# Patient Record
Sex: MALE | Race: WHITE | NOT HISPANIC OR LATINO
[De-identification: names, ages, dates, MRNs, and addresses within clinical notes are randomized per-mention and may not be internally consistent; named-entity substitution may affect disease eponyms.]

---

## 2020-02-10 PROBLEM — Z00.00 ENCOUNTER FOR PREVENTIVE HEALTH EXAMINATION: Status: ACTIVE | Noted: 2020-02-10

## 2020-02-11 ENCOUNTER — APPOINTMENT (OUTPATIENT)
Dept: MRI IMAGING | Facility: CLINIC | Age: 50
End: 2020-02-11

## 2020-06-09 ENCOUNTER — APPOINTMENT (OUTPATIENT)
Dept: MRI IMAGING | Facility: HOSPITAL | Age: 50
End: 2020-06-09
Payer: COMMERCIAL

## 2020-06-09 ENCOUNTER — OUTPATIENT (OUTPATIENT)
Dept: OUTPATIENT SERVICES | Facility: HOSPITAL | Age: 50
LOS: 1 days | End: 2020-06-09
Payer: COMMERCIAL

## 2020-06-09 DIAGNOSIS — Z00.8 ENCOUNTER FOR OTHER GENERAL EXAMINATION: ICD-10-CM

## 2020-06-09 PROCEDURE — A9579: CPT

## 2020-06-09 PROCEDURE — 74183 MRI ABD W/O CNTR FLWD CNTR: CPT | Mod: 26

## 2020-06-09 PROCEDURE — 74183 MRI ABD W/O CNTR FLWD CNTR: CPT

## 2021-02-15 ENCOUNTER — TRANSCRIPTION ENCOUNTER (OUTPATIENT)
Age: 51
End: 2021-02-15

## 2021-06-10 ENCOUNTER — NON-APPOINTMENT (OUTPATIENT)
Age: 51
End: 2021-06-10

## 2021-06-10 ENCOUNTER — APPOINTMENT (OUTPATIENT)
Dept: ELECTROPHYSIOLOGY | Facility: CLINIC | Age: 51
End: 2021-06-10
Payer: COMMERCIAL

## 2021-06-10 VITALS
OXYGEN SATURATION: 98 % | BODY MASS INDEX: 30.09 KG/M2 | DIASTOLIC BLOOD PRESSURE: 65 MMHG | SYSTOLIC BLOOD PRESSURE: 120 MMHG | HEIGHT: 75 IN | HEART RATE: 82 BPM | WEIGHT: 242 LBS

## 2021-06-10 DIAGNOSIS — F17.210 NICOTINE DEPENDENCE, CIGARETTES, UNCOMPLICATED: ICD-10-CM

## 2021-06-10 DIAGNOSIS — Z78.9 OTHER SPECIFIED HEALTH STATUS: ICD-10-CM

## 2021-06-10 PROCEDURE — 93000 ELECTROCARDIOGRAM COMPLETE: CPT

## 2021-06-10 PROCEDURE — 99072 ADDL SUPL MATRL&STAF TM PHE: CPT

## 2021-06-10 PROCEDURE — 99205 OFFICE O/P NEW HI 60 MIN: CPT

## 2021-07-12 PROBLEM — F17.210 SMOKES CIGARETTES: Status: ACTIVE | Noted: 2021-06-10

## 2021-07-12 PROBLEM — Z78.9 CAFFEINE USE: Status: ACTIVE | Noted: 2021-06-10

## 2021-07-12 NOTE — ASSESSMENT
[FreeTextEntry1] : This is a 50 year old man with hemochromatosis MR demonstrates increased liver iron stores. He was found to have frequent PVCs. Echo demonstrates normal LV. He has not had specific quantification of PVC burden. Will start with long term holter (Ziopatch) to quantify PVC burden. May need cardiac MRI  with T2* imaging to quantify Cardiac involvement of hemochromatosis.  \par \par Follow up in 1-2 months after monitor complete.

## 2021-07-12 NOTE — CARDIOLOGY SUMMARY
[de-identified] : Sinus rhythm 80 frequent PVCs.  PVCs RBBB Inferior axis.  [de-identified] : 3/10/21 : mild LA dilatation, LV EF 62%, mild mod MR, mild TR,

## 2021-07-12 NOTE — HISTORY OF PRESENT ILLNESS
[FreeTextEntry1] : Mr. Victoria is a 50 year old man with hemochromatosis rare palpitations who is found to have frequent PVCs. KARTHIKEYAN VICTORIA  denies chest pain, chest pressure, shortness of breath, lightheadedness, dizziness, syncope, presyncope, orthopnea, PND, or edema.

## 2021-08-05 ENCOUNTER — TRANSCRIPTION ENCOUNTER (OUTPATIENT)
Age: 51
End: 2021-08-05

## 2021-08-12 ENCOUNTER — APPOINTMENT (OUTPATIENT)
Dept: ELECTROPHYSIOLOGY | Facility: CLINIC | Age: 51
End: 2021-08-12
Payer: COMMERCIAL

## 2021-08-12 VITALS
DIASTOLIC BLOOD PRESSURE: 90 MMHG | HEART RATE: 34 BPM | BODY MASS INDEX: 30.59 KG/M2 | HEIGHT: 75 IN | OXYGEN SATURATION: 100 % | RESPIRATION RATE: 16 BRPM | WEIGHT: 246 LBS | SYSTOLIC BLOOD PRESSURE: 140 MMHG

## 2021-08-12 PROCEDURE — 93000 ELECTROCARDIOGRAM COMPLETE: CPT

## 2021-08-12 PROCEDURE — 99214 OFFICE O/P EST MOD 30 MIN: CPT

## 2021-08-19 ENCOUNTER — OUTPATIENT (OUTPATIENT)
Dept: OUTPATIENT SERVICES | Facility: HOSPITAL | Age: 51
LOS: 1 days | End: 2021-08-19
Payer: COMMERCIAL

## 2021-08-19 ENCOUNTER — APPOINTMENT (OUTPATIENT)
Dept: MRI IMAGING | Facility: CLINIC | Age: 51
End: 2021-08-19
Payer: COMMERCIAL

## 2021-08-19 DIAGNOSIS — I49.3 VENTRICULAR PREMATURE DEPOLARIZATION: ICD-10-CM

## 2021-08-19 DIAGNOSIS — E83.110 HEREDITARY HEMOCHROMATOSIS: ICD-10-CM

## 2021-08-19 PROCEDURE — 75561 CARDIAC MRI FOR MORPH W/DYE: CPT | Mod: 26

## 2021-08-19 PROCEDURE — 75561 CARDIAC MRI FOR MORPH W/DYE: CPT

## 2021-08-19 PROCEDURE — A9585: CPT

## 2021-09-03 ENCOUNTER — APPOINTMENT (OUTPATIENT)
Dept: ELECTROPHYSIOLOGY | Facility: CLINIC | Age: 51
End: 2021-09-03
Payer: COMMERCIAL

## 2021-09-03 VITALS
OXYGEN SATURATION: 99 % | DIASTOLIC BLOOD PRESSURE: 76 MMHG | HEART RATE: 43 BPM | WEIGHT: 246 LBS | BODY MASS INDEX: 30.59 KG/M2 | SYSTOLIC BLOOD PRESSURE: 133 MMHG | HEIGHT: 75 IN

## 2021-09-03 DIAGNOSIS — I47.2 VENTRICULAR TACHYCARDIA: ICD-10-CM

## 2021-09-03 PROCEDURE — 99214 OFFICE O/P EST MOD 30 MIN: CPT

## 2021-09-03 PROCEDURE — 93000 ELECTROCARDIOGRAM COMPLETE: CPT

## 2021-09-20 PROBLEM — I47.2 NSVT (NONSUSTAINED VENTRICULAR TACHYCARDIA): Status: ACTIVE | Noted: 2021-08-12

## 2021-09-20 NOTE — ASSESSMENT
[FreeTextEntry1] : This is a 50 year old man with hemochromatosis MR demonstrates increased liver iron stores. He was found to have frequent PVCs. Echo demonstrates normal LV.  He underwent a Zio patch monitor that demonstrated NSVT as well as 23.7% PVCs.  He has not had an MRI yet. Will get a T2* scan to evaluate for cardiac iron stores.  Will likely need ablation of PVCs in future.  We discussed treatment options of continuing on medical therapy, adding an antiarrhythmic drug, or catheter ablation. We discussed the procedures, outcomes and risks of catheter ablation at length. The risks discussed included but were not limited to bleeding, perforation, deep venous thrombosis, cardiac tamponade with a need for intervention, stroke, and complete heart block. After discussion all questions were answered. \par

## 2021-09-20 NOTE — CARDIOLOGY SUMMARY
[de-identified] : Sinus rhythm 80 frequent PVCs.  PVCs RBBB Inferior axis.  [de-identified] : 3/10/21 : mild LA dilatation, LV EF 62%, mild mod MR, mild TR,

## 2021-09-20 NOTE — ASSESSMENT
[FreeTextEntry1] : This is a 50 year old man with hemochromatosis MR demonstrates increased liver iron stores. He was found to have frequent PVCs. Echo demonstrates normal LV.  He underwent a Zio patch monitor that demonstrated NSVT as well as 23.7% PVCs. MRI  demonstrated no LGE and no cardiac iron overload.  \par \par Will likely benefit from ablation of PVCs.  We discussed treatment options of continuing on medical therapy, adding an antiarrhythmic drug, or catheter ablation. We discussed the procedures, outcomes and risks of catheter ablation at length. The risks discussed included but were not limited to bleeding, perforation, deep venous thrombosis, cardiac tamponade with a need for intervention, stroke, and complete heart block. After discussion all questions were answered.  He wishes to have procedure at [facility] will refer to Dr. Goode. \par  \par To continue beta blocker.

## 2021-09-20 NOTE — PHYSICAL EXAM
[Well Developed] : well developed [Well Nourished] : well nourished [Normal Conjunctiva] : normal conjunctiva [No Acute Distress] : no acute distress [Normal Venous Pressure] : normal venous pressure [No Carotid Bruit] : no carotid bruit [Normal S1, S2] : normal S1, S2 [No Murmur] : no murmur [No Gallop] : no gallop [No Rub] : no rub [Clear Lung Fields] : clear lung fields [Good Air Entry] : good air entry [No Respiratory Distress] : no respiratory distress  [Soft] : abdomen soft [Non Tender] : non-tender [No Masses/organomegaly] : no masses/organomegaly [Normal Bowel Sounds] : normal bowel sounds [Normal Gait] : normal gait [No Edema] : no edema [No Cyanosis] : no cyanosis [No Clubbing] : no clubbing [No Varicosities] : no varicosities [No Rash] : no rash [No Skin Lesions] : no skin lesions [Moves all extremities] : moves all extremities [No Focal Deficits] : no focal deficits [Normal Speech] : normal speech [Alert and Oriented] : alert and oriented [Normal memory] : normal memory

## 2021-09-20 NOTE — CARDIOLOGY SUMMARY
[de-identified] : Sinus rhythm 80 frequent PVCs.  PVCs RBBB Inferior axis.  [de-identified] : 8/6/21 : Zio patch predominate sinus  bpm 23.7% PVCs with 3 runs NSVT fastest 235 bpm longest 5 beats.  [de-identified] : 3/10/21 : mild LA dilatation, LV EF 62%, mild mod MR, mild TR,  [de-identified] : \par Normal right ventricular size and function. Normal left ventricular size with left ventricular dyssynchrony. No abnormal late gadolinium enhancement.\par \par LV EDV = 155 ml\par LV EDVi = 65 ml/m2\par LV ESV = 75 ml\par LV ESVi = 31 ml/m2\par LV SV = 80 ml\par LV EF = 52 %\par \par T2* = 35 ms.  (T2* values less than 20 are no associated with iron overload)\par \par VALVES:\par There is no evidence of valvular abnormality.\par \par VESSELS:\par The thoracic aorta has normal caliber.  There is a left aortic arch with typical branching.\par The central pulmonary arteries are normal in caliber.\par There is typical pulmonary venous return.\par There is typical systemic venous return.\par \par THORAX:\par There is no adenopathy in the thorax.\par There is no pleural or pericardial effusion.\par \par UPPER ABDOMEN:\par Images of the upper abdomen demonstrate no abnormality.\par \par IMPRESSION:\par Left ventricular dyssynchrony.  LV EF = 52 %  There is no abnormal delayed enhancement.\par No evidence of cardiac iron overload.\par

## 2021-09-20 NOTE — HISTORY OF PRESENT ILLNESS
[FreeTextEntry1] : This is a 50 year old man with frequent PVCs on ECG, hemochromatosis and palpitations. He underwent a Zio patch monitor that demonstrated 23.7% PVCs with 3 runs of NSVT longest 5 beats.  KARTHIKEYAN VICTORIA  denies chest pain, chest pressure, shortness of breath, lightheadedness, dizziness, syncope, presyncope, orthopnea, PND, or edema.

## 2021-09-21 ENCOUNTER — NON-APPOINTMENT (OUTPATIENT)
Age: 51
End: 2021-09-21

## 2021-09-21 ENCOUNTER — APPOINTMENT (OUTPATIENT)
Dept: ELECTROPHYSIOLOGY | Facility: CLINIC | Age: 51
End: 2021-09-21
Payer: COMMERCIAL

## 2021-09-21 VITALS
HEART RATE: 54 BPM | BODY MASS INDEX: 29.84 KG/M2 | WEIGHT: 240 LBS | SYSTOLIC BLOOD PRESSURE: 128 MMHG | OXYGEN SATURATION: 99 % | DIASTOLIC BLOOD PRESSURE: 72 MMHG | RESPIRATION RATE: 14 BRPM | HEIGHT: 75 IN

## 2021-09-21 DIAGNOSIS — Z78.9 OTHER SPECIFIED HEALTH STATUS: ICD-10-CM

## 2021-09-21 PROCEDURE — 93000 ELECTROCARDIOGRAM COMPLETE: CPT

## 2021-09-21 PROCEDURE — 99215 OFFICE O/P EST HI 40 MIN: CPT

## 2021-09-24 PROBLEM — Z78.9 NO FAMILY HISTORY OF SUDDEN DEATH: Status: ACTIVE | Noted: 2021-09-24

## 2021-09-24 NOTE — PHYSICAL EXAM
[Well Developed] : well developed [Well Nourished] : well nourished [No Acute Distress] : no acute distress [Normal Conjunctiva] : normal conjunctiva [Normal Venous Pressure] : normal venous pressure [Normal S1, S2] : normal S1, S2 [No Rub] : no rub [No Murmur] : no murmur [No Gallop] : no gallop [Clear Lung Fields] : clear lung fields [Good Air Entry] : good air entry [No Respiratory Distress] : no respiratory distress  [Soft] : abdomen soft [Non Tender] : non-tender [No Masses/organomegaly] : no masses/organomegaly [Normal Bowel Sounds] : normal bowel sounds [Normal Gait] : normal gait [No Edema] : no edema [No Cyanosis] : no cyanosis [No Rash] : no rash [Moves all extremities] : moves all extremities [No Focal Deficits] : no focal deficits [Normal Speech] : normal speech [Alert and Oriented] : alert and oriented [Normal memory] : normal memory

## 2021-09-25 NOTE — HISTORY OF PRESENT ILLNESS
[FreeTextEntry1] : I had the pleasure of seeing Gino Paz today for consultation for PVCs in the arrhythmia clinic at [facility]. As you well know, he is a pleasant 50 year old gentleman with a history of hypertension, hemochromatosis and premature ventricular contractions. He states that was diagnosed with his PVCs about 2 years ago incidentally on a routine exam with his PCP. At that time his PVCs were not frequent but has increased in frequency since then. A work up was initiated and he saw Dr. Laith Singh for further evaluation. An Echocardiogram was performed which showed normal LV function. A Ziopatch demonstrated a 23.7% ventricular ectopy burden and a cardiac MRI showed no late gadolinium enhancement and no evidence of cardiac iron deposits. He denies any palpitations, shortness of breath, chest pain, near syncope or syncope. He does state since his diagnosis he has been trying to be cognizant of his heart rhythm and may frequently feel a "skipping" when he is laying down. He is on metoprolol and reports lately he has been feeling tired although he attributes this to the metoprolol.\par

## 2021-09-25 NOTE — CARDIOLOGY SUMMARY
[de-identified] : today: Sinus  Rhythm. RBRI ("W" in I)\par \par ECG (Emy 10, 2021): sinus rhythm with frequent PVCs (RBI) [de-identified] : Alina (7/8 - 7/21/2021)\par VE= 23.7% including 5 beat NSVT. Single morphology representing 22.5% of burden\par  [de-identified] : TTE 3/10/2021\par Mild LAE\par LV global LV is normal\par LVEF is normal (62%)\par There is mild to moderate MR\par Mild TR\par  [de-identified] : MRI 9/1/2021\par LV dyssynchrony. LVEF= 52%.  The is no abnormal delayed enhancement.\par No evidence of cardiac iron overload

## 2021-09-25 NOTE — DISCUSSION/SUMMARY
[FreeTextEntry1] : In summary, Mr. 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 He has an overall burden of close to 24% with a normal LV function and a cardiac MRI demonstrating no evidence of LGE or cardiac iron deposits. He has symptoms of fatigue and may be attributed to his high PVC frequency although patient attributes to his metoprolol. We have discussed with patient and his wife via phone options of management of his PVCs including medication management versus ablative therapies. Risks and benefits discussed with each option. We also discussed ablation including the procedure and risks of the procedure. We discussed that if ablation is successful, he may be able to come off metoprolol and if medication is needed for hypertension an alternative medication could be used. After all questions were answered, patient would like to proceed with an ablation. We will arrange for his procedure to be scheduled. He will discontinue his metoprolol 3 days prior to his procedure\par

## 2021-09-25 NOTE — END OF VISIT
[FreeTextEntry3] : seen and examined with PA. I agree with H and P, A and P.\par OT PVC, possible GCV.  Plan for EPS with an aim at ablation [Time Spent: ___ minutes] : I have spent [unfilled] minutes of time on the encounter.

## 2021-10-11 ENCOUNTER — APPOINTMENT (OUTPATIENT)
Dept: DISASTER EMERGENCY | Facility: CLINIC | Age: 51
End: 2021-10-11

## 2021-10-11 DIAGNOSIS — Z01.818 ENCOUNTER FOR OTHER PREPROCEDURAL EXAMINATION: ICD-10-CM

## 2021-10-12 LAB — SARS-COV-2 N GENE NPH QL NAA+PROBE: NOT DETECTED

## 2021-10-13 ENCOUNTER — NON-APPOINTMENT (OUTPATIENT)
Age: 51
End: 2021-10-13

## 2021-10-14 ENCOUNTER — INPATIENT (INPATIENT)
Facility: HOSPITAL | Age: 51
LOS: 0 days | Discharge: HOME | DRG: 274 | End: 2021-10-14
Attending: INTERNAL MEDICINE | Admitting: INTERNAL MEDICINE
Payer: COMMERCIAL

## 2021-10-14 VITALS
HEART RATE: 83 BPM | SYSTOLIC BLOOD PRESSURE: 122 MMHG | OXYGEN SATURATION: 95 % | DIASTOLIC BLOOD PRESSURE: 85 MMHG | RESPIRATION RATE: 17 BRPM

## 2021-10-14 VITALS — WEIGHT: 240.08 LBS | HEIGHT: 74 IN

## 2021-10-14 DIAGNOSIS — I49.3 VENTRICULAR PREMATURE DEPOLARIZATION: ICD-10-CM

## 2021-10-14 DIAGNOSIS — Z90.49 ACQUIRED ABSENCE OF OTHER SPECIFIED PARTS OF DIGESTIVE TRACT: Chronic | ICD-10-CM

## 2021-10-14 LAB
ANION GAP SERPL CALC-SCNC: 13 MMOL/L — SIGNIFICANT CHANGE UP (ref 5–17)
APTT BLD: 35.9 SEC — HIGH (ref 27.5–35.5)
BLD GP AB SCN SERPL QL: NEGATIVE — SIGNIFICANT CHANGE UP
BUN SERPL-MCNC: 13 MG/DL — SIGNIFICANT CHANGE UP (ref 7–23)
CALCIUM SERPL-MCNC: 9.2 MG/DL — SIGNIFICANT CHANGE UP (ref 8.4–10.5)
CHLORIDE SERPL-SCNC: 103 MMOL/L — SIGNIFICANT CHANGE UP (ref 96–108)
CO2 SERPL-SCNC: 24 MMOL/L — SIGNIFICANT CHANGE UP (ref 22–31)
CREAT SERPL-MCNC: 0.92 MG/DL — SIGNIFICANT CHANGE UP (ref 0.5–1.3)
GLUCOSE SERPL-MCNC: 105 MG/DL — HIGH (ref 70–99)
HCT VFR BLD CALC: 45.2 % — SIGNIFICANT CHANGE UP (ref 39–50)
HGB BLD-MCNC: 15.9 G/DL — SIGNIFICANT CHANGE UP (ref 13–17)
INR BLD: 1.04 RATIO — SIGNIFICANT CHANGE UP (ref 0.88–1.16)
MCHC RBC-ENTMCNC: 31.1 PG — SIGNIFICANT CHANGE UP (ref 27–34)
MCHC RBC-ENTMCNC: 35.2 GM/DL — SIGNIFICANT CHANGE UP (ref 32–36)
MCV RBC AUTO: 88.3 FL — SIGNIFICANT CHANGE UP (ref 80–100)
NRBC # BLD: 0 /100 WBCS — SIGNIFICANT CHANGE UP (ref 0–0)
PLATELET # BLD AUTO: 165 K/UL — SIGNIFICANT CHANGE UP (ref 150–400)
POTASSIUM SERPL-MCNC: 4.8 MMOL/L — SIGNIFICANT CHANGE UP (ref 3.5–5.3)
POTASSIUM SERPL-SCNC: 4.8 MMOL/L — SIGNIFICANT CHANGE UP (ref 3.5–5.3)
PROTHROM AB SERPL-ACNC: 12.5 SEC — SIGNIFICANT CHANGE UP (ref 10.6–13.6)
RBC # BLD: 5.12 M/UL — SIGNIFICANT CHANGE UP (ref 4.2–5.8)
RBC # FLD: 12.4 % — SIGNIFICANT CHANGE UP (ref 10.3–14.5)
RH IG SCN BLD-IMP: POSITIVE — SIGNIFICANT CHANGE UP
RH IG SCN BLD-IMP: POSITIVE — SIGNIFICANT CHANGE UP
SODIUM SERPL-SCNC: 140 MMOL/L — SIGNIFICANT CHANGE UP (ref 135–145)
WBC # BLD: 7.37 K/UL — SIGNIFICANT CHANGE UP (ref 3.8–10.5)
WBC # FLD AUTO: 7.37 K/UL — SIGNIFICANT CHANGE UP (ref 3.8–10.5)

## 2021-10-14 PROCEDURE — 93654 COMPRE EP EVAL TX VT: CPT

## 2021-10-14 PROCEDURE — C1889: CPT

## 2021-10-14 PROCEDURE — 86901 BLOOD TYPING SEROLOGIC RH(D): CPT

## 2021-10-14 PROCEDURE — 93623 PRGRMD STIMJ&PACG IV RX NFS: CPT | Mod: 26

## 2021-10-14 PROCEDURE — 86900 BLOOD TYPING SEROLOGIC ABO: CPT

## 2021-10-14 PROCEDURE — 86850 RBC ANTIBODY SCREEN: CPT

## 2021-10-14 PROCEDURE — 85610 PROTHROMBIN TIME: CPT

## 2021-10-14 PROCEDURE — 85027 COMPLETE CBC AUTOMATED: CPT

## 2021-10-14 PROCEDURE — 93623 PRGRMD STIMJ&PACG IV RX NFS: CPT

## 2021-10-14 PROCEDURE — 80048 BASIC METABOLIC PNL TOTAL CA: CPT

## 2021-10-14 PROCEDURE — 93005 ELECTROCARDIOGRAM TRACING: CPT

## 2021-10-14 PROCEDURE — 93010 ELECTROCARDIOGRAM REPORT: CPT

## 2021-10-14 PROCEDURE — C1894: CPT

## 2021-10-14 PROCEDURE — 93662 INTRACARDIAC ECG (ICE): CPT

## 2021-10-14 PROCEDURE — 85730 THROMBOPLASTIN TIME PARTIAL: CPT

## 2021-10-14 PROCEDURE — C1732: CPT

## 2021-10-14 PROCEDURE — C1760: CPT

## 2021-10-14 PROCEDURE — C1759: CPT

## 2021-10-14 PROCEDURE — C1766: CPT

## 2021-10-14 PROCEDURE — 36415 COLL VENOUS BLD VENIPUNCTURE: CPT

## 2021-10-14 PROCEDURE — 93662 INTRACARDIAC ECG (ICE): CPT | Mod: 26

## 2021-10-14 RX ORDER — METOPROLOL TARTRATE 50 MG
1 TABLET ORAL
Qty: 0 | Refills: 0 | DISCHARGE

## 2021-10-14 RX ORDER — ASPIRIN/CALCIUM CARB/MAGNESIUM 324 MG
1 TABLET ORAL
Qty: 30 | Refills: 0
Start: 2021-10-14 | End: 2021-11-12

## 2021-10-14 RX ORDER — SODIUM CHLORIDE 9 MG/ML
3 INJECTION INTRAMUSCULAR; INTRAVENOUS; SUBCUTANEOUS EVERY 8 HOURS
Refills: 0 | Status: DISCONTINUED | OUTPATIENT
Start: 2021-10-14 | End: 2021-10-14

## 2021-10-14 RX ORDER — ASPIRIN/CALCIUM CARB/MAGNESIUM 324 MG
81 TABLET ORAL DAILY
Refills: 0 | Status: DISCONTINUED | OUTPATIENT
Start: 2021-10-14 | End: 2021-10-14

## 2021-10-14 RX ADMIN — SODIUM CHLORIDE 3 MILLILITER(S): 9 INJECTION INTRAMUSCULAR; INTRAVENOUS; SUBCUTANEOUS at 14:00

## 2021-10-14 RX ADMIN — Medication 81 MILLIGRAM(S): at 19:11

## 2021-10-14 NOTE — PRE-OP CHECKLIST - MD NOTIFIED
NP at [facility] notified, patient took Metoprolol 3 days ago/MD Notified (Please specify)

## 2021-10-14 NOTE — H&P CARDIOLOGY - NSICDXPASTMEDICALHX_GEN_ALL_CORE_FT
PAST MEDICAL HISTORY:  Frequent PVCs     Hereditary hemochromatosis     HTN (hypertension)     NSVT (nonsustained ventricular tachycardia) 5 beats NSVT on Zio patch 7/8-7/21/2021

## 2021-10-14 NOTE — ASU DISCHARGE PLAN (ADULT/PEDIATRIC) - CARE PROVIDER_API CALL
Luis Goode)  Cardiac Electrophysiology; Cardiology  [address] [place] [address]  Phone: [phone]  Fax: [phone]  Scheduled Appointment: 12/21/2021 04:15 PM

## 2021-10-14 NOTE — H&P CARDIOLOGY - HISTORY OF PRESENT ILLNESS
50 year old gentleman (denies implanted devices) with a significant PMHx of hypertension, hemochromatosis and frequent PVCs presents for PVC ablation. Patient states that was diagnosed with his PVCs about 2 years ago incidentally on a routine exam with his PCP. At that time his PVCs were not frequent but has increased in frequency since then. A work up was initiated and he saw Dr. Laith Singh for further evaluation. An Echocardiogram was performed which showed normal LV function. A Ziopatch demonstrated a 23.7% ventricular ectopy burden and a cardiac MRI showed no late gadolinium enhancement and no evidence of cardiac iron deposits. He does state since his diagnosis he has been trying to be cognizant of his heart rhythm and may frequently feel a "skipping" when he is laying down. He has symptoms of fatigue and may be attributed to his high PVC frequency although patient attributes to his metoprolol. Presents to Dr. Goode for further arrythmia management.     Patient currently denies chest pain, palpitations, orthopnea or PND.

## 2021-10-14 NOTE — ASU DISCHARGE PLAN (ADULT/PEDIATRIC) - ASU DC SPECIAL INSTRUCTIONSFT
WOUND CARE:  The day AFTER your procedure  - Remove the bandage at the site GENTLY, clean with mild soap and water, and pat dry; leave open to air  - You may shower   - DO NOT apply lotions, creams, ointments, powder, perfumes to your incision site  -Check your groin every day. A small amount of bruising or soreness is normal, a bump ( smaller than nickel) might be present, normal  - DO NOT SOAK your site for 1 week ( no baths, no pools, no tubs, etc..)    ACTIVITY:  Your procedure was done through your groin  for the next 5 DAYS:  - Limit climbing stairs, no strenuous activity, pushing , pulling, or straining   DO NOT LIFT anything 10 lbs or heavier   you may resume sexual activity in 7 days, unless instructed otherwise  mild palpitations are normal     Follow heart healthy diet recommended by your doctor, , if you smoke STOP SMOKING ( may call [phone] for center of tobacco control if you need assistance).  for the next 24 hours:   - stay at home and rest, do not drive or operate heavy machinery   do not drink alcoholic beverages   do not make important personal or business decisions     ***CALL YOUR DOCTOR ***  IF you have fever, chills, body aches, or severe pain, swelling, redness, heat, yellow drainage from your incision site  IF bleeding  or significant new swelling from your puncture site  IF you experience rapid heartbeat or palpitations that cause: lightheadedness, dizziness, or fainting spell.  If you experience difficulty swallowing, or pain with swallowing   IF unable to get in contact with your doctor, you may call the Cardiology Office at [facility] at [phone]

## 2021-10-15 ENCOUNTER — NON-APPOINTMENT (OUTPATIENT)
Age: 51
End: 2021-10-15

## 2021-12-20 NOTE — PHYSICAL EXAM
[Well Developed] : well developed [Well Nourished] : well nourished [No Acute Distress] : no acute distress [Normal Conjunctiva] : normal conjunctiva [Normal Venous Pressure] : normal venous pressure [Normal S1, S2] : normal S1, S2 [No Murmur] : no murmur [No Rub] : no rub [No Gallop] : no gallop [Clear Lung Fields] : clear lung fields [Good Air Entry] : good air entry [No Respiratory Distress] : no respiratory distress  [Soft] : abdomen soft [Non Tender] : non-tender [No Masses/organomegaly] : no masses/organomegaly [Normal Bowel Sounds] : normal bowel sounds [Normal Gait] : normal gait [No Edema] : no edema [No Cyanosis] : no cyanosis [No Rash] : no rash [Moves all extremities] : moves all extremities [No Focal Deficits] : no focal deficits [Normal Speech] : normal speech [Alert and Oriented] : alert and oriented [Normal memory] : normal memory

## 2021-12-21 ENCOUNTER — APPOINTMENT (OUTPATIENT)
Dept: ELECTROPHYSIOLOGY | Facility: CLINIC | Age: 51
End: 2021-12-21
Payer: COMMERCIAL

## 2021-12-21 ENCOUNTER — NON-APPOINTMENT (OUTPATIENT)
Age: 51
End: 2021-12-21

## 2021-12-21 VITALS
OXYGEN SATURATION: 99 % | BODY MASS INDEX: 29.84 KG/M2 | HEART RATE: 93 BPM | DIASTOLIC BLOOD PRESSURE: 79 MMHG | WEIGHT: 240 LBS | SYSTOLIC BLOOD PRESSURE: 122 MMHG | HEIGHT: 75 IN

## 2021-12-21 PROCEDURE — 99213 OFFICE O/P EST LOW 20 MIN: CPT

## 2021-12-21 PROCEDURE — 93000 ELECTROCARDIOGRAM COMPLETE: CPT

## 2021-12-21 RX ORDER — VALSARTAN AND HYDROCHLOROTHIAZIDE 80; 12.5 MG/1; MG/1
80-12.5 TABLET, FILM COATED ORAL DAILY
Refills: 0 | Status: ACTIVE | COMMUNITY

## 2021-12-21 RX ORDER — METOPROLOL SUCCINATE 25 MG/1
25 TABLET, EXTENDED RELEASE ORAL
Refills: 0 | Status: DISCONTINUED | COMMUNITY
End: 2021-12-21

## 2021-12-21 NOTE — HISTORY OF PRESENT ILLNESS
[FreeTextEntry1] : Mr. Paz presents for followup post PVC ablation on 10/14/2021 (outflow tract PVC mapped to the GCV). He has done well post ablation and feels well. He reports he feels much better especially off the metoprolol. He is now on Diovan for HTN control. He denies any chest pain, shortness of breath, palpitations, groin issues, near syncope or syncope.

## 2021-12-21 NOTE — END OF VISIT
[FreeTextEntry3] : seen and examined with PA. \par I agree with H and P, A and P.\par Doing well post ablation f/u PRN

## 2021-12-21 NOTE — DISCUSSION/SUMMARY
[FreeTextEntry1] : In summary, Mr Paz is a 51 year old gentleman with a history of HTN and frequent PVS who is s/p ablation of outflow tract PVCs mapped to the GCV on 10/14/2021. He is doing well without any recurrence of PVCs off metoprolol. He will follow up with his cardiologist in about 3 months and then can follow up with his PCP thereafter if his ECG continues to show no recurrent PVCs. He can follow up with us as needed.

## 2021-12-21 NOTE — CARDIOLOGY SUMMARY
[de-identified] : today: Sinus  Rhythm \par -Nonspecific ST depression  -Nondiagnostic. \par \par ECG (Emy 10, 2021): sinus rhythm with frequent PVCs (RBI) [de-identified] : Alina (7/8 - 7/21/2021)\par VE= 23.7% including 5 beat NSVT. Single morphology representing 22.5% of burden\par  [de-identified] : TTE 3/10/2021\par Mild LAE\par LV global LV is normal\par LVEF is normal (62%)\par There is mild to moderate MR\par Mild TR\par  [de-identified] : MRI 9/1/2021\par LV dyssynchrony. LVEF= 52%.  The is no abnormal delayed enhancement.\par No evidence of cardiac iron overload [de-identified] : 10/14/2021\par Successful ablation of PVC mapped to the GCV. Ablation performed on the LV endocardium opposite this site.

## 2022-01-14 ENCOUNTER — EMERGENCY (EMERGENCY)
Facility: HOSPITAL | Age: 52
LOS: 1 days | Discharge: HOME | End: 2022-01-14
Attending: EMERGENCY MEDICINE | Admitting: EMERGENCY MEDICINE
Payer: COMMERCIAL

## 2022-01-14 VITALS — RESPIRATION RATE: 18 BRPM | OXYGEN SATURATION: 98 %

## 2022-01-14 VITALS
DIASTOLIC BLOOD PRESSURE: 72 MMHG | HEIGHT: 74 IN | RESPIRATION RATE: 18 BRPM | OXYGEN SATURATION: 98 % | SYSTOLIC BLOOD PRESSURE: 101 MMHG | WEIGHT: 240.08 LBS | HEART RATE: 126 BPM | TEMPERATURE: 100 F

## 2022-01-14 DIAGNOSIS — Z90.49 ACQUIRED ABSENCE OF OTHER SPECIFIED PARTS OF DIGESTIVE TRACT: Chronic | ICD-10-CM

## 2022-01-14 PROBLEM — I10 ESSENTIAL (PRIMARY) HYPERTENSION: Chronic | Status: ACTIVE | Noted: 2021-10-14

## 2022-01-14 PROBLEM — E83.110 HEREDITARY HEMOCHROMATOSIS: Chronic | Status: ACTIVE | Noted: 2021-10-14

## 2022-01-14 PROBLEM — I49.3 VENTRICULAR PREMATURE DEPOLARIZATION: Chronic | Status: ACTIVE | Noted: 2021-10-14

## 2022-01-14 PROBLEM — I47.2 VENTRICULAR TACHYCARDIA: Chronic | Status: ACTIVE | Noted: 2021-10-14

## 2022-01-14 PROCEDURE — 93010 ELECTROCARDIOGRAM REPORT: CPT

## 2022-01-14 PROCEDURE — 99284 EMERGENCY DEPT VISIT MOD MDM: CPT

## 2022-01-14 PROCEDURE — 93005 ELECTROCARDIOGRAM TRACING: CPT

## 2022-01-14 PROCEDURE — 99284 EMERGENCY DEPT VISIT MOD MDM: CPT | Mod: 25

## 2022-01-14 RX ORDER — KETOROLAC TROMETHAMINE 30 MG/ML
60 SYRINGE (ML) INJECTION ONCE
Refills: 0 | Status: DISCONTINUED | OUTPATIENT
Start: 2022-01-14 | End: 2022-01-14

## 2022-01-14 RX ADMIN — Medication 60 MILLIGRAM(S): at 12:10

## 2022-01-14 RX ADMIN — Medication 60 MILLIGRAM(S): at 12:15

## 2022-01-14 NOTE — ED PROVIDER NOTE - PATIENT PORTAL LINK FT
You can access the FollowMyHealth Patient Portal offered by [facility] by registering at the following website: [email or web address]. By joining [employer]’s FollowMyHealth portal, you will also be able to view your health information using other applications (apps) compatible with our system.

## 2022-01-14 NOTE — ED PROVIDER NOTE - ATTENDING CONTRIBUTION TO CARE
Dr. Mckenzie: I performed a face to face bedside interview with patient regarding history of present illness, review of symptoms and past medical history. I completed an independent physical exam.  I have discussed patient's plan of care with PA.   I agree with note as stated above, having amended the EMR as needed to reflect my findings.   This includes HISTORY OF PRESENT ILLNESS, HIV, PAST MEDICAL/SURGICAL/FAMILY/SOCIAL HISTORY, ALLERGIES AND HOME MEDICATIONS, REVIEW OF SYSTEMS, PHYSICAL EXAM, and any PROGRESS NOTES during the time I functioned as the attending physician for this patient.  Gen:  ill, non toxic appearing in NAD  Head:  NC/AT  HEENT: pupils perrl,no pharyngeal erythema, uvula midline  Cardiac: S1S2, RRR  Abd: Soft, non tender  Resp: No distress, CTA   musculoskeletal:: no deformities, no swelling, strength +5/+5  Skin: warm and dry as visualized, no rashes  Neuro: BLANDON, cn2-12, aao x 4  Psych:alert, cooperative, appropriate mood and affect for situation

## 2022-01-14 NOTE — ED ADULT NURSE NOTE - OBJECTIVE STATEMENT
Pt arrived to ED states he is covid positive say 8. c/o fever, cough bodyaches and weakness. pt states at home his oxygen was 88%. While in ED resting sat 98% and ambulating sat 98%. Toradol given and pt discharged to home.

## 2022-01-14 NOTE — ED PROVIDER NOTE - NSFOLLOWUPINSTRUCTIONS_ED_ALL_ED_FT
Take tylenol 650 mg every 4 hours as needed for pain   Take motrin 600mg every 6 hours as needed for pain   Drink plenty of fluids.         COVID-19 (Coronavirus Disease 2019)    WHAT YOU NEED TO KNOW:    COVID-19 is the disease caused by a coronavirus first discovered in December 2019. Coronaviruses generally cause upper respiratory (nose, throat, and lung) infections, such as a cold. The 2019 virus spreads quickly and easily. It can be spread starting 2 to 3 days before symptoms even begin.    DISCHARGE INSTRUCTIONS:    Call your local emergency number (911 in the ) if:   •You have trouble breathing or shortness of breath at rest.      •You have chest pain or pressure that lasts longer than 5 minutes.      •You become confused or hard to wake.      •Your lips or face are blue.      Seek care immediately if:   •You have a fever of 104°F (40°C) or higher.          Call your doctor if:   •You have symptoms of COVID-19.      •You have questions or concerns about your condition or care.      Medicines: You may need any of the following:   •Decongestants help reduce nasal congestion and help you breathe more easily. If you take decongestant pills, they may make you feel restless or cause problems with your sleep. Do not use decongestant sprays for more than a few days.      •Cough suppressants help reduce coughing. Ask your healthcare provider which type of cough medicine is best for you.      •Acetaminophen decreases pain and fever. It is available without a doctor's order. Ask how much to take and how often to take it. Follow directions. Read the labels of all other medicines you are using to see if they also contain acetaminophen, or ask your doctor or pharmacist. Acetaminophen can cause liver damage if not taken correctly. Do not use more than 4 grams (4,000 milligrams) total of acetaminophen in one day.       •NSAIDs, such as ibuprofen, help decrease swelling, pain, and fever. This medicine is available with or without a doctor's order. NSAIDs can cause stomach bleeding or kidney problems in certain people. If you take blood thinner medicine, always ask your healthcare provider if NSAIDs are safe for you. Always read the medicine label and follow directions.      •Blood thinners help prevent blood clots. Clots can cause strokes, heart attacks, and death. The following are general safety guidelines to follow while you are taking a blood thinner:?Watch for bleeding and bruising while you take blood thinners. Watch for bleeding from your gums or nose. Watch for blood in your urine and bowel movements. Use a soft washcloth on your skin, and a soft toothbrush to brush your teeth. This can keep your skin and gums from bleeding. If you shave, use an electric shaver. Do not play contact sports.       ?Tell your dentist and other healthcare providers that you take a blood thinner. Wear a bracelet or necklace that says you take this medicine.       ?Do not start or stop any other medicines unless your healthcare provider tells you to. Many medicines cannot be used with blood thinners.      ?Take your blood thinner exactly as prescribed by your healthcare provider. Do not skip does or take less than prescribed. Tell your provider right away if you forget to take your blood thinner, or if you take too much.      ?Warfarin is a blood thinner that you may need to take. The following are things you should be aware of if you take warfarin: ?Foods and medicines can affect the amount of warfarin in your blood. Do not make major changes to your diet while you take warfarin. Warfarin works best when you eat about the same amount of vitamin K every day. Vitamin K is found in green leafy vegetables and certain other foods. Ask for more information about what to eat when you are taking warfarin.      ?You will need to see your healthcare provider for follow-up visits when you are on warfarin. You will need regular blood tests. These tests are used to decide how much medicine you need.         •Take your medicine as directed. Contact your healthcare provider if you think your medicine is not helping or if you have side effects. Tell him or her if you are allergic to any medicine. Keep a list of the medicines, vitamins, and herbs you take. Include the amounts, and when and why you take them. Bring the list or the pill bottles to follow-up visits. Carry your medicine list with you in case of an emergency.      What you need to know about variants: The virus has changed into several new forms (called variants) since it was discovered. The variants may be more contagious (easily spread) than the original form. Some may also cause more severe illness than others.    What you need to know about COVID-19 vaccines: Healthcare providers recommend a COVID-19 vaccine, even if you have already had COVID-19. You are considered fully vaccinated against COVID-19 two weeks after the final dose of any COVID-19 vaccine. Let your healthcare provider know when you have received the final dose of the vaccine. Make a copy of your vaccination card. Keep the original with you in case you need to show it. Keep the copy in a safe place.  •COVID-19 vaccines are given as a shot in 1 or 2 doses.   Vaccination is recommended for everyone 5 years or older. One 2-dose vaccine is fully approved for those 16 or older. This vaccine also has an emergency use authorization (EUA) for children 5 to 15 years old. No vaccine is currently available for children younger than 5 years. An additional (booster) dose is also recommended for all adults 18 or older. The booster can be a different brand of the COVID-19 vaccine than you originally received. The timing for the booster depends on the type of vaccine you received:?1-dose vaccine: The booster is given at least 2 months after you received the vaccine.      ?2-dose vaccine: The booster is given at least 6 months after the second dose.    COVID-19 Immunization Schedule         •Continue social distancing and other measures, even after you get the vaccine. Although it is not common, you can become infected after you get the vaccine. You may also be able to pass the virus to others without knowing you are infected.      •After you get the vaccine, check local, national, and international travel rules. You may need to be tested before you travel. Some countries require proof of a negative test before you travel. You may also need to quarantine after you return.      How the 2019 coronavirus spreads:   •Droplets are the main way all coronaviruses spread. The virus travels in droplets that form when a person talks, sings, coughs, or sneezes. The droplets can also float in the air for minutes or hours. Infection happens when you breathe in the droplets or get them in your eyes or nose. Close personal contact with an infected person increases your risk for infection. This means being within 6 feet (2 meters) of the person for at least 15 minutes over 24 hours.      •Person-to-person contact can spread the virus. For example, a person with the virus on his or her hands can spread it by shaking hands with someone.      •The virus can stay on objects and surfaces for up to 3 days. You may become infected by touching the object or surface and then touching your eyes or mouth.      Help lower the risk for COVID-19:   •Wash your hands often throughout the day. Use soap and water. Rub your soapy hands together, lacing your fingers, for at least 20 seconds. Rinse with warm, running water. Dry your hands with a clean towel or paper towel. Use hand  that contains alcohol if soap and water are not available. Teach children how to wash their hands and use hand .  Handwashing           •Cover sneezes and coughs. Turn your face away and cover your mouth and nose with a tissue. Throw the tissue away. Use the bend of your arm if a tissue is not available. Then wash your hands well with soap and water or use hand . Teach children how to cover a cough or sneeze.      •Wear a face covering (mask) when needed. Use a cloth covering with at least 2 layers. You can also create layers by putting a cloth covering over a disposable non-medical mask. Cover your mouth and your nose.  How to Wear a Face Covering (Mask)           •Follow worldwide, national, and local social distancing guidelines. Keep at least 6 feet (2 meters) between you and others.      •Try not to touch your face. If you get the virus on your hands, you can transfer it to your eyes, nose, or mouth and become infected. You can also transfer it to objects, surfaces, or people.      •Clean and disinfect high-touch surfaces and objects often. Use disinfecting wipes, or make a solution of 4 teaspoons of bleach in 1 quart (4 cups) of water.      •Ask about other vaccines you may need. Get the influenza (flu) vaccine as soon as recommended each year, usually starting in September or October. Get the pneumonia vaccine if recommended. Your healthcare provider can tell you if you should also get other vaccines, and when to get them.    Prevent COVID-19 Infection         Follow social distancing guidelines: National and local social distancing rules vary. Rules and restrictions may change over time as restrictions are lifted. The following are general guidelines:   •Stay home if you are sick or think you may have COVID-19. It is important to stay home if you are waiting for a testing appointment or for test results.      •Avoid close physical contact with anyone who does not live in your home. Do not shake hands with, hug, or kiss a person as a greeting. If you must use public transportation (such as a bus or subway), try to sit or stand away from others. Wear your face covering.      •Avoid in-person gatherings and crowds. Attend virtually if possible.      Follow up with your doctor as directed: Write down your questions so you remember to ask them during your visits.    For more information:   •Centers for Disease Control and Prevention  [address] [place] [address]  Phone: 1-277.345.8066  Web Address: http://www.cdc.gov           © Copyright [employer] 2022           back to top                          © Copyright [employer] 2022

## 2022-01-14 NOTE — ED PROVIDER NOTE - CLINICAL SUMMARY MEDICAL DECISION MAKING FREE TEXT BOX
51 yr old male with no pmhx, Covid + day 8,  presents with fever, cough and generalized weakness. Pt reports he took his O2 at home which was 88, but was not sure if it works. No current shortness of breath or chest pain. Pt has not taken tylenol since yesterday. Pt not vaccinated.   well appearing, clear lungs, abdomen soft non tender   98 % on RA, walking sat 97%   pt given torarol IM, stable for dc with supportive tx.

## 2022-01-14 NOTE — ED PROVIDER NOTE - OBJECTIVE STATEMENT
51 yr old male with no pmhx, Covid + day 8,  presents with fever, cough and generalized weakness. Pt reports he took his O2 at home which was 88, but was not sure if it works. No current shortness of breath or chest pain. Pt has not taken tylenol since yesterday. Pt not vaccinated.

## 2022-01-19 ENCOUNTER — TRANSCRIPTION ENCOUNTER (OUTPATIENT)
Age: 52
End: 2022-01-19

## 2023-04-05 PROBLEM — I49.3 FREQUENT PVCS: Status: ACTIVE | Noted: 2021-07-12

## 2023-04-05 PROBLEM — E83.110 HEREDITARY HEMOCHROMATOSIS: Status: ACTIVE | Noted: 2021-06-10

## 2023-04-05 PROBLEM — I10 HYPERTENSION, UNSPECIFIED TYPE: Status: ACTIVE | Noted: 2021-06-10

## 2023-04-05 NOTE — HISTORY OF PRESENT ILLNESS
[FreeTextEntry1] : This is a new general cardiology evaluation for Mr. 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 Single morphology representing 22.5% of burden\par  \par Echo: TTE 3/10/2021\par Mild LAE\par LV global LV is normal\par LVEF is normal (62%)\par There is mild to moderate MR\par Mild TR\par  \par MRI: MRI 9/1/2021\par LV dyssynchrony. LVEF= 52%. The is no abnormal delayed enhancement.\par No evidence of cardiac iron overload \par \par EP: 10/14/2021\par Successful ablation of PVC mapped to the GCV. Ablation performed on the LV endocardium opposite this site. \par \par \par

## 2023-04-06 ENCOUNTER — APPOINTMENT (OUTPATIENT)
Dept: CARDIOLOGY | Facility: CLINIC | Age: 53
End: 2023-04-06

## 2023-04-06 DIAGNOSIS — I49.3 VENTRICULAR PREMATURE DEPOLARIZATION: ICD-10-CM

## 2023-04-06 DIAGNOSIS — I10 ESSENTIAL (PRIMARY) HYPERTENSION: ICD-10-CM

## 2023-04-06 DIAGNOSIS — E83.110 HEREDITARY HEMOCHROMATOSIS: ICD-10-CM

## 2023-10-20 NOTE — ED PROVIDER NOTE - CROS ED RESP ALL NEG
IMPRESSION:    Acute hypoxemic respiratory failure sp intubation slowly improving  Aspiration pneumonia  DVT   HO recent duodenal perforation   foot OM/ MRI noted  HO polymicrobial bacteremia   H/o CP  H/o seizures    PLAN:    CNS: PRN sedation. V EEG. Neuro fup    HEENT: Oral care.  ET care     PULMONARY: HOB at 45 degrees.  Aspiration precaution.  SaO2 92 TO 96%.  DTA.  Monitor PPL and DP  CT angio noted, dec FIO2 40% SBT    CARDIOVASCULAR: Goal directed fluid resuscitation.  taper pressors    GI: Protonix BID.  OG feeding.  Bowel regimen     RENAL:    FU lytes.  Correct as needed.      INFECTIOUS DISEASE:  ABX PER ID    HEMATOLOGICAL: DVT prophylaxis seq.  Monitor CBC. lovenox therapeutic    ENDOCRINE:  Follow up FS.  Insulin protocol if needed.    MUSCULOSKELETAL: Bedrest.  Off loading.  Wound care.      Prognosis guarded.      [facility] 10/17   IMPRESSION:    Acute hypoxemic respiratory failure sp intubation slowly improving  Aspiration pneumonia  DVT   HO recent duodenal perforation   foot OM/ MRI noted  HO polymicrobial bacteremia   H/o CP  H/o seizures    PLAN:    CNS: PRN sedation. V EEG. Neuro fup    HEENT: Oral care.  ET care     PULMONARY: HOB at 45 degrees.  Aspiration precaution.  SaO2 92 TO 96%.  DTA.  Monitor PPL and DP  CT angio noted, dec FIO2 40% SBT    CARDIOVASCULAR: Goal directed fluid resuscitation.  taper pressors    GI: Protonix BID.  OG feeding.  Bowel regimen     RENAL:    FU lytes.  Correct as needed.      INFECTIOUS DISEASE:  ABX PER ID, add caspo, FUNGITELL NOTED    HEMATOLOGICAL: DVT prophylaxis seq.  Monitor CBC. lovenox therapeutic    ENDOCRINE:  Follow up FS.  Insulin protocol if needed. HC    MUSCULOSKELETAL: Bedrest.  Off loading.  Wound care.      Prognosis guarded.      [facility] 10/17   - - -

## 2024-07-01 ENCOUNTER — APPOINTMENT (OUTPATIENT)
Dept: CARDIOLOGY | Facility: CLINIC | Age: 54
End: 2024-07-01

## 2024-11-18 NOTE — PRE-OP CHECKLIST - TO WHOM
For information on Fall & Injury Prevention, visit: [email or web address] OR  [email or web address] OR  https://www.cdc.gov/steadi/patient.html
pending

## 2025-03-04 ENCOUNTER — APPOINTMENT (OUTPATIENT)
Dept: CARDIOLOGY | Facility: CLINIC | Age: 55
End: 2025-03-04
Payer: COMMERCIAL

## 2025-03-04 ENCOUNTER — NON-APPOINTMENT (OUTPATIENT)
Age: 55
End: 2025-03-04

## 2025-03-04 VITALS
WEIGHT: 248 LBS | OXYGEN SATURATION: 99 % | BODY MASS INDEX: 30.84 KG/M2 | DIASTOLIC BLOOD PRESSURE: 79 MMHG | HEART RATE: 70 BPM | SYSTOLIC BLOOD PRESSURE: 118 MMHG | HEIGHT: 75 IN

## 2025-03-04 DIAGNOSIS — I10 ESSENTIAL (PRIMARY) HYPERTENSION: ICD-10-CM

## 2025-03-04 DIAGNOSIS — I49.3 VENTRICULAR PREMATURE DEPOLARIZATION: ICD-10-CM

## 2025-03-04 DIAGNOSIS — I47.29 OTHER VENTRICULAR TACHYCARDIA: ICD-10-CM

## 2025-03-04 DIAGNOSIS — R06.09 OTHER FORMS OF DYSPNEA: ICD-10-CM

## 2025-03-04 PROCEDURE — 93000 ELECTROCARDIOGRAM COMPLETE: CPT | Mod: 59

## 2025-03-04 PROCEDURE — 93225 XTRNL ECG REC<48 HRS REC: CPT

## 2025-03-04 PROCEDURE — 99204 OFFICE O/P NEW MOD 45 MIN: CPT | Mod: 25

## 2025-03-10 PROCEDURE — 93227 XTRNL ECG REC<48 HR R&I: CPT

## 2025-03-24 ENCOUNTER — APPOINTMENT (OUTPATIENT)
Dept: CARDIOLOGY | Facility: CLINIC | Age: 55
End: 2025-03-24
Payer: COMMERCIAL

## 2025-03-24 PROCEDURE — 93306 TTE W/DOPPLER COMPLETE: CPT

## 2025-03-24 PROCEDURE — 93015 CV STRESS TEST SUPVJ I&R: CPT

## 2025-06-14 ENCOUNTER — APPOINTMENT (OUTPATIENT)
Dept: DERMATOLOGY | Facility: CLINIC | Age: 55
End: 2025-06-14

## 2025-06-14 PROCEDURE — 11102 TANGNTL BX SKIN SINGLE LES: CPT

## 2025-06-14 PROCEDURE — 99203 OFFICE O/P NEW LOW 30 MIN: CPT | Mod: 25

## 2025-07-01 ENCOUNTER — NON-APPOINTMENT (OUTPATIENT)
Age: 55
End: 2025-07-01